# Patient Record
Sex: MALE | Race: WHITE | NOT HISPANIC OR LATINO | URBAN - METROPOLITAN AREA
[De-identification: names, ages, dates, MRNs, and addresses within clinical notes are randomized per-mention and may not be internally consistent; named-entity substitution may affect disease eponyms.]

---

## 2017-05-31 ENCOUNTER — OUTPATIENT (OUTPATIENT)
Dept: OUTPATIENT SERVICES | Facility: HOSPITAL | Age: 82
LOS: 1 days | End: 2017-05-31

## 2018-06-05 ENCOUNTER — OUTPATIENT (OUTPATIENT)
Dept: OUTPATIENT SERVICES | Facility: HOSPITAL | Age: 83
LOS: 1 days | End: 2018-06-05

## 2019-06-10 ENCOUNTER — OUTPATIENT (OUTPATIENT)
Dept: OUTPATIENT SERVICES | Facility: HOSPITAL | Age: 84
LOS: 1 days | End: 2019-06-10

## 2019-11-03 PROCEDURE — 71045 X-RAY EXAM CHEST 1 VIEW: CPT | Mod: 26

## 2019-11-04 ENCOUNTER — INPATIENT (INPATIENT)
Facility: HOSPITAL | Age: 84
LOS: 3 days | Discharge: HOME HEALTH | End: 2019-11-08
Payer: MEDICARE

## 2019-11-04 PROCEDURE — 99285 EMERGENCY DEPT VISIT HI MDM: CPT

## 2019-11-04 PROCEDURE — 93010 ELECTROCARDIOGRAM REPORT: CPT

## 2019-11-04 PROCEDURE — 76705 ECHO EXAM OF ABDOMEN: CPT | Mod: 26

## 2019-11-05 ENCOUNTER — OUTPATIENT (OUTPATIENT)
Dept: OUTPATIENT SERVICES | Facility: HOSPITAL | Age: 84
LOS: 1 days | End: 2019-11-05

## 2019-11-05 PROCEDURE — 93971 EXTREMITY STUDY: CPT | Mod: 26,RT

## 2019-11-05 PROCEDURE — 93306 TTE W/DOPPLER COMPLETE: CPT | Mod: 26

## 2019-11-05 PROCEDURE — 72148 MRI LUMBAR SPINE W/O DYE: CPT | Mod: 26

## 2019-11-06 ENCOUNTER — OUTPATIENT (OUTPATIENT)
Dept: OUTPATIENT SERVICES | Facility: HOSPITAL | Age: 84
LOS: 1 days | End: 2019-11-06

## 2019-11-07 ENCOUNTER — OUTPATIENT (OUTPATIENT)
Dept: OUTPATIENT SERVICES | Facility: HOSPITAL | Age: 84
LOS: 1 days | End: 2019-11-07

## 2019-11-13 ENCOUNTER — OUTPATIENT (OUTPATIENT)
Dept: OUTPATIENT SERVICES | Facility: HOSPITAL | Age: 84
LOS: 1 days | End: 2019-11-13
Payer: MEDICARE

## 2019-11-13 PROCEDURE — 74178 CT ABD&PLV WO CNTR FLWD CNTR: CPT | Mod: 26

## 2019-11-22 ENCOUNTER — OUTPATIENT (OUTPATIENT)
Dept: OUTPATIENT SERVICES | Facility: HOSPITAL | Age: 84
LOS: 1 days | End: 2019-11-22
Payer: MEDICARE

## 2019-11-22 PROCEDURE — 71046 X-RAY EXAM CHEST 2 VIEWS: CPT | Mod: 26

## 2019-12-09 ENCOUNTER — OUTPATIENT (OUTPATIENT)
Dept: OUTPATIENT SERVICES | Facility: HOSPITAL | Age: 84
LOS: 1 days | End: 2019-12-09

## 2020-06-08 ENCOUNTER — OUTPATIENT (OUTPATIENT)
Dept: OUTPATIENT SERVICES | Facility: HOSPITAL | Age: 85
LOS: 1 days | End: 2020-06-08

## 2020-07-05 PROBLEM — Z00.00 ENCOUNTER FOR PREVENTIVE HEALTH EXAMINATION: Status: ACTIVE | Noted: 2020-07-05

## 2020-07-10 DIAGNOSIS — Z01.818 ENCOUNTER FOR OTHER PREPROCEDURAL EXAMINATION: ICD-10-CM

## 2020-07-11 ENCOUNTER — APPOINTMENT (OUTPATIENT)
Dept: DISASTER EMERGENCY | Facility: CLINIC | Age: 85
End: 2020-07-11

## 2020-07-12 LAB — SARS-COV-2 N GENE NPH QL NAA+PROBE: NOT DETECTED

## 2020-07-14 ENCOUNTER — OUTPATIENT (OUTPATIENT)
Dept: OUTPATIENT SERVICES | Facility: HOSPITAL | Age: 85
LOS: 1 days | End: 2020-07-14

## 2020-10-02 ENCOUNTER — APPOINTMENT (OUTPATIENT)
Dept: UROLOGY | Facility: CLINIC | Age: 85
End: 2020-10-02
Payer: MEDICARE

## 2020-10-02 VITALS
TEMPERATURE: 97.3 F | SYSTOLIC BLOOD PRESSURE: 163 MMHG | HEART RATE: 75 BPM | HEIGHT: 72 IN | WEIGHT: 245 LBS | DIASTOLIC BLOOD PRESSURE: 77 MMHG | BODY MASS INDEX: 33.18 KG/M2

## 2020-10-02 DIAGNOSIS — Z78.9 OTHER SPECIFIED HEALTH STATUS: ICD-10-CM

## 2020-10-02 DIAGNOSIS — Z85.828 PERSONAL HISTORY OF OTHER MALIGNANT NEOPLASM OF SKIN: ICD-10-CM

## 2020-10-02 DIAGNOSIS — Z86.79 PERSONAL HISTORY OF OTHER DISEASES OF THE CIRCULATORY SYSTEM: ICD-10-CM

## 2020-10-02 PROCEDURE — 51798 US URINE CAPACITY MEASURE: CPT

## 2020-10-02 PROCEDURE — 99204 OFFICE O/P NEW MOD 45 MIN: CPT | Mod: 25

## 2020-10-02 RX ORDER — PRAVASTATIN SODIUM 40 MG/1
40 TABLET ORAL
Refills: 0 | Status: ACTIVE | COMMUNITY

## 2020-10-02 RX ORDER — ZOLPIDEM TARTRATE 10 MG/1
10 TABLET ORAL
Refills: 0 | Status: ACTIVE | COMMUNITY

## 2020-10-02 RX ORDER — OXYCODONE HYDROCHLORIDE AND ACETAMINOPHEN 5; 325 MG/1; MG/1
5-325 TABLET ORAL
Refills: 0 | Status: ACTIVE | COMMUNITY

## 2020-10-02 RX ORDER — LOSARTAN POTASSIUM 25 MG/1
25 TABLET, FILM COATED ORAL
Refills: 0 | Status: ACTIVE | COMMUNITY

## 2020-10-02 RX ORDER — GABAPENTIN 100 MG/1
100 CAPSULE ORAL
Refills: 0 | Status: ACTIVE | COMMUNITY

## 2020-10-02 RX ORDER — ESOMEPRAZOLE SODIUM 40 MG/5ML
40 INJECTION, POWDER, LYOPHILIZED, FOR SOLUTION INTRAVENOUS
Refills: 0 | Status: ACTIVE | COMMUNITY

## 2020-10-02 NOTE — REVIEW OF SYSTEMS
[Wake up at night to urinate  How many times?  ___] : wakes up to urinate [unfilled] times during the night [Strong urge to urinate] : strong urge to urinate [Negative] : Heme/Lymph [see HPI] : see HPI

## 2020-10-02 NOTE — HISTORY OF PRESENT ILLNESS
[FreeTextEntry1] : Mr. NOEMI MAGALLANES 85 year old  M  PMH GERD, HTN, HLD, Prostate cancer s/p prostatectomy at age 62 and PSH right knee, cholcystectomy, lower back surgery. His entire life he would have to concentrate to try and get himself to urinate Pt comes and states this has started last week. He feels he has been urinating frequently and feels it is "clogged."\par \par PVR 0\par

## 2020-10-05 LAB
APPEARANCE: CLEAR
BACTERIA UR CULT: NORMAL
BACTERIA: NEGATIVE
BILIRUBIN URINE: NEGATIVE
BLOOD URINE: NORMAL
COLOR: YELLOW
GLUCOSE QUALITATIVE U: NEGATIVE
HYALINE CASTS: 1 /LPF
KETONES URINE: NEGATIVE
LEUKOCYTE ESTERASE URINE: NEGATIVE
MICROSCOPIC-UA: NORMAL
NITRITE URINE: NEGATIVE
PH URINE: 6
PROTEIN URINE: ABNORMAL
RED BLOOD CELLS URINE: 2 /HPF
SPECIFIC GRAVITY URINE: 1.02
SQUAMOUS EPITHELIAL CELLS: 1 /HPF
UROBILINOGEN URINE: NORMAL
WHITE BLOOD CELLS URINE: 1 /HPF

## 2020-11-03 ENCOUNTER — APPOINTMENT (OUTPATIENT)
Dept: UROLOGY | Facility: CLINIC | Age: 85
End: 2020-11-03

## 2020-11-17 ENCOUNTER — APPOINTMENT (OUTPATIENT)
Dept: UROLOGY | Facility: CLINIC | Age: 85
End: 2020-11-17
Payer: MEDICARE

## 2020-11-17 VITALS
DIASTOLIC BLOOD PRESSURE: 81 MMHG | TEMPERATURE: 97.6 F | SYSTOLIC BLOOD PRESSURE: 158 MMHG | WEIGHT: 240 LBS | HEART RATE: 76 BPM | BODY MASS INDEX: 32.51 KG/M2 | HEIGHT: 72 IN

## 2020-11-17 PROCEDURE — 99213 OFFICE O/P EST LOW 20 MIN: CPT

## 2020-11-17 NOTE — HISTORY OF PRESENT ILLNESS
[FreeTextEntry1] : Pt come sin follow up LUTS. He feels the alfuzosin has helped.  Pt happier with his urination and does not wish to have a cystoscopy.

## 2021-05-04 ENCOUNTER — APPOINTMENT (OUTPATIENT)
Dept: UROLOGY | Facility: CLINIC | Age: 86
End: 2021-05-04
Payer: MEDICARE

## 2021-05-04 VITALS
DIASTOLIC BLOOD PRESSURE: 86 MMHG | WEIGHT: 240 LBS | HEART RATE: 71 BPM | BODY MASS INDEX: 32.51 KG/M2 | TEMPERATURE: 97.9 F | HEIGHT: 72 IN | SYSTOLIC BLOOD PRESSURE: 141 MMHG

## 2021-05-04 PROCEDURE — 99214 OFFICE O/P EST MOD 30 MIN: CPT

## 2021-05-17 ENCOUNTER — OUTPATIENT (OUTPATIENT)
Dept: OUTPATIENT SERVICES | Facility: HOSPITAL | Age: 86
LOS: 1 days | End: 2021-05-17
Payer: MEDICARE

## 2021-05-17 DIAGNOSIS — D69.6 THROMBOCYTOPENIA, UNSPECIFIED: ICD-10-CM

## 2021-05-18 ENCOUNTER — RESULT REVIEW (OUTPATIENT)
Age: 86
End: 2021-05-18

## 2021-05-18 ENCOUNTER — APPOINTMENT (OUTPATIENT)
Dept: HEMATOLOGY ONCOLOGY | Facility: CLINIC | Age: 86
End: 2021-05-18
Payer: MEDICARE

## 2021-05-18 ENCOUNTER — NON-APPOINTMENT (OUTPATIENT)
Age: 86
End: 2021-05-18

## 2021-05-18 VITALS
BODY MASS INDEX: 33.18 KG/M2 | SYSTOLIC BLOOD PRESSURE: 107 MMHG | HEIGHT: 72 IN | OXYGEN SATURATION: 94 % | HEART RATE: 84 BPM | TEMPERATURE: 97.6 F | DIASTOLIC BLOOD PRESSURE: 70 MMHG | WEIGHT: 245 LBS

## 2021-05-18 DIAGNOSIS — Z84.0 FAMILY HISTORY OF DISEASES OF THE SKIN AND SUBCUTANEOUS TISSUE: ICD-10-CM

## 2021-05-18 LAB
BASOPHILS # BLD AUTO: 0 K/UL — SIGNIFICANT CHANGE UP (ref 0–0.2)
BASOPHILS NFR BLD AUTO: 0 % — SIGNIFICANT CHANGE UP (ref 0–2)
EOSINOPHIL # BLD AUTO: 0 K/UL — SIGNIFICANT CHANGE UP (ref 0–0.5)
EOSINOPHIL NFR BLD AUTO: 0 % — SIGNIFICANT CHANGE UP (ref 0–6)
HCT VFR BLD CALC: 45 % — SIGNIFICANT CHANGE UP (ref 39–50)
HGB BLD-MCNC: 15.1 G/DL — SIGNIFICANT CHANGE UP (ref 13–17)
LYMPHOCYTES # BLD AUTO: 2.15 K/UL — SIGNIFICANT CHANGE UP (ref 1–3.3)
LYMPHOCYTES # BLD AUTO: 44 % — SIGNIFICANT CHANGE UP (ref 13–44)
MCHC RBC-ENTMCNC: 32.3 PG — SIGNIFICANT CHANGE UP (ref 27–34)
MCHC RBC-ENTMCNC: 33.6 GM/DL — SIGNIFICANT CHANGE UP (ref 32–36)
MCV RBC AUTO: 96.4 FL — SIGNIFICANT CHANGE UP (ref 80–100)
MONOCYTES # BLD AUTO: 0.73 K/UL — SIGNIFICANT CHANGE UP (ref 0–0.9)
MONOCYTES NFR BLD AUTO: 15 % — HIGH (ref 2–14)
NEUTROPHILS # BLD AUTO: 2 K/UL — SIGNIFICANT CHANGE UP (ref 1.8–7.4)
NEUTROPHILS NFR BLD AUTO: 41 % — LOW (ref 43–77)
NRBC # BLD: 0 /100 — SIGNIFICANT CHANGE UP (ref 0–0)
NRBC # BLD: SIGNIFICANT CHANGE UP /100 WBCS (ref 0–0)
PLAT MORPH BLD: NORMAL — SIGNIFICANT CHANGE UP
PLATELET # BLD AUTO: 124 K/UL — LOW (ref 150–400)
RBC # BLD: 4.67 M/UL — SIGNIFICANT CHANGE UP (ref 4.2–5.8)
RBC # FLD: 12.6 % — SIGNIFICANT CHANGE UP (ref 10.3–14.5)
RBC BLD AUTO: NORMAL — SIGNIFICANT CHANGE UP
TSH SERPL-MCNC: 2.6 UIU/ML — SIGNIFICANT CHANGE UP (ref 0.27–4.2)
WBC # BLD: 4.89 K/UL — SIGNIFICANT CHANGE UP (ref 3.8–10.5)
WBC # FLD AUTO: 4.89 K/UL — SIGNIFICANT CHANGE UP (ref 3.8–10.5)

## 2021-05-18 PROCEDURE — 99204 OFFICE O/P NEW MOD 45 MIN: CPT

## 2021-05-19 ENCOUNTER — NON-APPOINTMENT (OUTPATIENT)
Age: 86
End: 2021-05-19

## 2021-05-19 LAB
CRP SERPL-MCNC: 3 MG/L — SIGNIFICANT CHANGE UP
ERYTHROCYTE [SEDIMENTATION RATE] IN BLOOD: 15 MM/HR — SIGNIFICANT CHANGE UP (ref 0–20)
HBV CORE AB SER-ACNC: SIGNIFICANT CHANGE UP
HBV CORE IGM SER-ACNC: SIGNIFICANT CHANGE UP
HBV SURFACE AB SER-ACNC: <3 MIU/ML — LOW
HBV SURFACE AB SER-ACNC: SIGNIFICANT CHANGE UP
HBV SURFACE AG SER-ACNC: SIGNIFICANT CHANGE UP
HCV AB S/CO SERPL IA: 0.14 S/CO — SIGNIFICANT CHANGE UP (ref 0–0.99)
HCV AB SERPL-IMP: SIGNIFICANT CHANGE UP
LDH SERPL L TO P-CCNC: 147 U/L — SIGNIFICANT CHANGE UP (ref 50–242)

## 2021-05-25 NOTE — RESULTS/DATA
[FreeTextEntry1] : Mr. Couch is a pleasant 86 year-old man with a history of prostate cancer status-post prostatectomy, now with rising PSA and ureteral blockage requiring stent, incidentally found to have mild thrombocytopenia.

## 2021-05-25 NOTE — HISTORY OF PRESENT ILLNESS
[de-identified] : Mr. Couch has a remote history of prostate cancer status-post prostatectomy (patient states 20 years ago), here for evaluation of thrombocytopenia. Paulino spends part of the year near [place], [place]. 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 He states that his PSA was elevated at that time, but that a PET-CT was negative for recurrent disease. \par \par Upon his return to [place], he saw Dr. Natarajan, who incidentally found a platelet count of 104. PSA was 2.7 at that time. Upon my review of available labs, Mr. Couch has had intermittent bouts of thrombocytopenia going back to at least 2019, ranging from 102 to 138. Of note, ESR and CRP were elevated at that time.\par \par Paulino drinks 2 alcoholic drinks every other day, is not known to have any liver disease or hepatosplenomegaly. He denies a history of chronic viral illnesses, no ongoing blood loss. He notes arm ecchymoses, but no petechiae.

## 2021-05-25 NOTE — CONSULT LETTER
[Dear  ___] : Dear  [unfilled], [Consult Letter:] : I had the pleasure of evaluating your patient, [unfilled]. [Please see my note below.] : Please see my note below. [Consult Closing:] : Thank you very much for allowing me to participate in the care of this patient.  If you have any questions, please do not hesitate to contact me. [Sincerely,] : Sincerely, [FreeTextEntry2] : Dr. Mary Natarajan [FreeTextEntry3] : Kali Lofton MD\par

## 2021-05-25 NOTE — PHYSICAL EXAM
[Ambulatory and capable of all self care but unable to carry out any work activities] : Status 2- Ambulatory and capable of all self care but unable to carry out any work activities. Up and about more than 50% of waking hours [Obese] : obese [Normal] : affect appropriate [de-identified] : walks with a cane [de-identified] : anicteric [de-identified] : antalgic gait

## 2021-06-08 ENCOUNTER — NON-APPOINTMENT (OUTPATIENT)
Age: 86
End: 2021-06-08

## 2021-06-08 ENCOUNTER — APPOINTMENT (OUTPATIENT)
Dept: UROLOGY | Facility: CLINIC | Age: 86
End: 2021-06-08
Payer: MEDICARE

## 2021-06-08 PROCEDURE — 99213 OFFICE O/P EST LOW 20 MIN: CPT

## 2021-06-08 NOTE — HISTORY OF PRESENT ILLNESS
[FreeTextEntry1] : Pt comes in follow up needing a stent placed in Jan bc of a blockage in the ureter. He had a stent placed. In April he had the stent removed. Pt currently on Alfuzosin. \par \par 3/5/21 PSA 2.5. \par 4/28/21 PSA 2.7.\par \par

## 2021-06-16 ENCOUNTER — APPOINTMENT (OUTPATIENT)
Dept: ULTRASOUND IMAGING | Facility: CLINIC | Age: 86
End: 2021-06-16
Payer: MEDICARE

## 2021-06-16 PROCEDURE — 76770 US EXAM ABDO BACK WALL COMP: CPT

## 2021-06-17 ENCOUNTER — APPOINTMENT (OUTPATIENT)
Dept: OPHTHALMOLOGY | Facility: CLINIC | Age: 86
End: 2021-06-17

## 2021-07-09 ENCOUNTER — APPOINTMENT (OUTPATIENT)
Dept: OPHTHALMOLOGY | Facility: CLINIC | Age: 86
End: 2021-07-09

## 2021-07-20 ENCOUNTER — OUTPATIENT (OUTPATIENT)
Dept: OUTPATIENT SERVICES | Facility: HOSPITAL | Age: 86
LOS: 1 days | End: 2021-07-20

## 2021-08-18 ENCOUNTER — APPOINTMENT (OUTPATIENT)
Dept: HEMATOLOGY ONCOLOGY | Facility: CLINIC | Age: 86
End: 2021-08-18

## 2021-10-20 ENCOUNTER — APPOINTMENT (OUTPATIENT)
Dept: UROLOGY | Facility: CLINIC | Age: 86
End: 2021-10-20
Payer: MEDICARE

## 2021-10-20 VITALS
WEIGHT: 245 LBS | HEART RATE: 73 BPM | TEMPERATURE: 97.5 F | DIASTOLIC BLOOD PRESSURE: 81 MMHG | SYSTOLIC BLOOD PRESSURE: 162 MMHG | BODY MASS INDEX: 33.18 KG/M2 | HEIGHT: 72 IN

## 2021-10-20 PROCEDURE — 99214 OFFICE O/P EST MOD 30 MIN: CPT

## 2021-10-20 NOTE — ASSESSMENT
[FreeTextEntry1] : Plan\par UA\par culture\par BMP\par c/w alfuzosin\par renal bladder US\par PSA\par fu with Dr. Keller.

## 2021-10-20 NOTE — HISTORY OF PRESENT ILLNESS
[FreeTextEntry1] : Pt having lower abdominal pain that started week ago that is on and off. \par \par 4/28/21 PSA 2.7 s/p prostatectomy

## 2021-10-21 ENCOUNTER — RESULT REVIEW (OUTPATIENT)
Age: 86
End: 2021-10-21

## 2021-10-21 ENCOUNTER — APPOINTMENT (OUTPATIENT)
Dept: ULTRASOUND IMAGING | Facility: CLINIC | Age: 86
End: 2021-10-21
Payer: MEDICARE

## 2021-10-21 ENCOUNTER — APPOINTMENT (OUTPATIENT)
Dept: FAMILY MEDICINE | Facility: CLINIC | Age: 86
End: 2021-10-21
Payer: MEDICARE

## 2021-10-21 ENCOUNTER — LABORATORY RESULT (OUTPATIENT)
Age: 86
End: 2021-10-21

## 2021-10-21 PROCEDURE — 76770 US EXAM ABDO BACK WALL COMP: CPT

## 2021-10-21 PROCEDURE — 36415 COLL VENOUS BLD VENIPUNCTURE: CPT

## 2021-10-27 ENCOUNTER — RESULT REVIEW (OUTPATIENT)
Age: 86
End: 2021-10-27

## 2021-10-27 ENCOUNTER — APPOINTMENT (OUTPATIENT)
Dept: UROLOGY | Facility: CLINIC | Age: 86
End: 2021-10-27
Payer: MEDICARE

## 2021-10-27 VITALS
TEMPERATURE: 97.7 F | HEART RATE: 79 BPM | HEIGHT: 72 IN | WEIGHT: 245 LBS | SYSTOLIC BLOOD PRESSURE: 138 MMHG | DIASTOLIC BLOOD PRESSURE: 85 MMHG | BODY MASS INDEX: 33.18 KG/M2

## 2021-10-27 PROCEDURE — 99215 OFFICE O/P EST HI 40 MIN: CPT

## 2021-10-29 ENCOUNTER — APPOINTMENT (OUTPATIENT)
Dept: CT IMAGING | Facility: CLINIC | Age: 86
End: 2021-10-29
Payer: MEDICARE

## 2021-10-29 PROCEDURE — 74176 CT ABD & PELVIS W/O CONTRAST: CPT | Mod: MH

## 2021-10-29 NOTE — ASSESSMENT
[FreeTextEntry1] : Biochemically recurrent PCa, steadily rising PSA - now PSA 3.96 - R/O metastatic hormone sensitive prostate cancer\par Elevated Creat: to 1.86\par hx of right hydro s/p stent placement x2\par Also incomplete bladder emptying\par \par Plan:\par Options for [facility]:\par 1) ADT + 6 cycles Docetaxel  (CHAARTED / STAMPEDE trials)\par \par 2) ADT + Abiraterone (Lattitude / STAMPEDE - 40% risk reduction with Janneth)\par \par 3) ADT + Apalutamide (TITAN - 52% risk reduction)\par \par 4) ADT +  Enzalutamide (Enzamet / ARCHES trial - 39% risk reduction)\par \par 5) ADT alone\par \par Patient will need a med onc consult to discuss options for tx.\par \par CT urogram\par Bone scan\par Start ADT in next 2 weeks\par FU with me in 2 weeks\par Referral to nephrology Dr Alessandro Medrano\par Follow-up with Dr Lofton med onc\par Please obtain CD's of CT scan and PET scan done in [place] in winter 2021 from the patient. should mail it to our office (all reports have been scanned to our charts).

## 2021-10-29 NOTE — HISTORY OF PRESENT ILLNESS
[FreeTextEntry1] : 85 yo male patient, hx of radical prostatectomy, 25 years ago\par Patient and wife mention it was "even lower than Gl 7 disease"\par PSA was undetectable for years\par Recently in Jan/feb 2021: found to be 2.5\par Repeated PSA October 2021: PSA=3.96\par never gotten treatment for any biochemical recurrence\par PET Axumen scan Feb 2021: no evidence of avidity / recurrence\par \par Also he had new onset right flank pain: \par per report from outside phys in [place] jan feb 2021: severe hydro right side\par had stent insertion x 2, removed stent in april 2021 (hydro was assumed to be due to possible right ureter stricture)\par \par Now cr is rising to 1.8\par PSA rising to 3.96\par \par SHARITA: flat, no obvious evidence of PCA recurrence\par Recent Ultrasound:  cc from a prevoid of 158 cc\par no hydro on US

## 2021-10-29 NOTE — LETTER BODY
[Dear  ___] : Dear  [unfilled], [Consult Letter:] : I had the pleasure of evaluating your patient, [unfilled]. [Please see my note below.] : Please see my note below. [Consult Closing:] : Thank you very much for allowing me to participate in the care of this patient.  If you have any questions, please do not hesitate to contact me. [Sincerely,] : Sincerely, [FreeTextEntry1] : Biochemical recurrence of Prostate cancer PSA 3.96\par R/O metastatic hormone sensitive PCa [FreeTextEntry3] : Brandon Keller MD\par Urologic Oncology\par [facility]\par [facility] [place] of Urology at [place]\par [facility]\par

## 2021-11-02 ENCOUNTER — APPOINTMENT (OUTPATIENT)
Dept: UROLOGY | Facility: CLINIC | Age: 86
End: 2021-11-02
Payer: MEDICARE

## 2021-11-02 VITALS
OXYGEN SATURATION: 95 % | SYSTOLIC BLOOD PRESSURE: 162 MMHG | WEIGHT: 240 LBS | TEMPERATURE: 96.8 F | HEART RATE: 75 BPM | BODY MASS INDEX: 32.51 KG/M2 | HEIGHT: 72 IN | DIASTOLIC BLOOD PRESSURE: 95 MMHG

## 2021-11-02 DIAGNOSIS — N13.30 UNSPECIFIED HYDRONEPHROSIS: ICD-10-CM

## 2021-11-02 LAB
ANION GAP SERPL CALC-SCNC: 22 MMOL/L
APPEARANCE: ABNORMAL
BACTERIA UR CULT: NORMAL
BACTERIA: NEGATIVE
BILIRUBIN URINE: NEGATIVE
BLOOD URINE: NORMAL
BUN SERPL-MCNC: 17 MG/DL
CALCIUM SERPL-MCNC: 8.9 MG/DL
CHLORIDE SERPL-SCNC: 102 MMOL/L
CO2 SERPL-SCNC: 13 MMOL/L
COLOR: ABNORMAL
CREAT SERPL-MCNC: 1.83 MG/DL
GLUCOSE QUALITATIVE U: NEGATIVE
GLUCOSE SERPL-MCNC: 144 MG/DL
HYALINE CASTS: 2 /LPF
KETONES URINE: NEGATIVE
LEUKOCYTE ESTERASE URINE: NEGATIVE
MICROSCOPIC-UA: NORMAL
NITRITE URINE: NEGATIVE
PH URINE: 5.5
POTASSIUM SERPL-SCNC: 4.4 MMOL/L
PROTEIN URINE: ABNORMAL
PSA SERPL-MCNC: 3.96 NG/ML
RED BLOOD CELLS URINE: 1 /HPF
SODIUM SERPL-SCNC: 137 MMOL/L
SPECIFIC GRAVITY URINE: 1.02
SQUAMOUS EPITHELIAL CELLS: 1 /HPF
UROBILINOGEN URINE: NORMAL
WHITE BLOOD CELLS URINE: 1 /HPF

## 2021-11-02 PROCEDURE — 99213 OFFICE O/P EST LOW 20 MIN: CPT

## 2021-11-02 NOTE — PHYSICAL EXAM
[General Appearance - Well Developed] : well developed [General Appearance - Well Nourished] : well nourished [Normal Appearance] : normal appearance [Well Groomed] : well groomed [General Appearance - In No Acute Distress] : no acute distress [Abdomen Soft] : soft [Abdomen Tenderness] : non-tender [Costovertebral Angle Tenderness] : no ~M costovertebral angle tenderness [Urethral Meatus] : meatus normal [Urinary Bladder Findings] : the bladder was normal on palpation [Scrotum] : the scrotum was normal [Testes Mass (___cm)] : there were no testicular masses [No Prostate Nodules] : no prostate nodules [Edema] : no peripheral edema [] : no respiratory distress [Respiration, Rhythm And Depth] : normal respiratory rhythm and effort [Exaggerated Use Of Accessory Muscles For Inspiration] : no accessory muscle use [Oriented To Time, Place, And Person] : oriented to person, place, and time [Affect] : the affect was normal [Mood] : the mood was normal [Not Anxious] : not anxious [Normal Station and Gait] : the gait and station were normal for the patient's age [No Focal Deficits] : no focal deficits

## 2021-11-02 NOTE — HISTORY OF PRESENT ILLNESS
[FreeTextEntry1] : Pt comes it to review most recent PSA and renal bladder US. 10/21 US shows no hydro\par \par 10/21/21 PSA 3.96

## 2021-11-03 ENCOUNTER — RESULT REVIEW (OUTPATIENT)
Age: 86
End: 2021-11-03

## 2021-11-03 ENCOUNTER — APPOINTMENT (OUTPATIENT)
Dept: NUCLEAR MEDICINE | Facility: CLINIC | Age: 86
End: 2021-11-03
Payer: MEDICARE

## 2021-11-03 ENCOUNTER — OUTPATIENT (OUTPATIENT)
Dept: OUTPATIENT SERVICES | Facility: HOSPITAL | Age: 86
LOS: 1 days | End: 2021-11-03

## 2021-11-03 DIAGNOSIS — C61 MALIGNANT NEOPLASM OF PROSTATE: ICD-10-CM

## 2021-11-03 PROCEDURE — 78306 BONE IMAGING WHOLE BODY: CPT | Mod: 26,ME

## 2021-11-03 PROCEDURE — 78830 RP LOCLZJ TUM SPECT W/CT 1: CPT | Mod: 26

## 2021-11-03 PROCEDURE — G1004: CPT

## 2021-11-08 ENCOUNTER — APPOINTMENT (OUTPATIENT)
Dept: UROLOGY | Facility: CLINIC | Age: 86
End: 2021-11-08
Payer: MEDICARE

## 2021-11-09 ENCOUNTER — APPOINTMENT (OUTPATIENT)
Dept: UROLOGY | Facility: CLINIC | Age: 86
End: 2021-11-09
Payer: MEDICARE

## 2021-11-09 VITALS
WEIGHT: 240 LBS | DIASTOLIC BLOOD PRESSURE: 83 MMHG | HEART RATE: 69 BPM | SYSTOLIC BLOOD PRESSURE: 140 MMHG | HEIGHT: 72 IN | BODY MASS INDEX: 32.51 KG/M2 | TEMPERATURE: 96.9 F | OXYGEN SATURATION: 98 %

## 2021-11-09 PROCEDURE — 99214 OFFICE O/P EST MOD 30 MIN: CPT

## 2021-11-09 NOTE — HISTORY OF PRESENT ILLNESS
[FreeTextEntry1] : 85 yo male patient, hx of radical prostatectomy, 25 years ago\par Patient and wife mention it was "even lower than Gl 7 disease"\par PSA was undetectable for years\par Recently in Jan/feb 2021: found to be 2.5\par Repeated PSA October 2021: PSA=3.96\par never gotten treatment for any biochemical recurrence\par PET Axumen scan Feb 2021: no evidence of avidity / recurrence\par \par Also he had new onset right flank pain: \par per report from outside phys in [place] jan feb 2021: severe hydro right side\par had stent insertion x 2, removed stent in april 2021 (hydro was assumed to be due to possible right ureter stricture)\par \par Now cr is rising to 1.8\par PSA rising to 3.96\par \par SHARITA: flat, no obvious evidence of PCA recurrence\par Recent Ultrasound:  cc from a prevoid of 158 cc\par no hydro on US\par \par Follow-up Nov 9:\par Bone scan: abnormal scan, single focus increased activity in skull - Do MRI\par CT non-contrast due to compromised renal function: No mets, no pelvic adenopathy.\par

## 2021-11-09 NOTE — LETTER BODY
[FreeTextEntry1] : Biochemical recurrence of Prostate cancer PSA 3.96\par R/O metastatic hormone sensitive PCa [FreeTextEntry3] : Brandon Keller MD\par Urologic Oncology\par [facility] Endoscopic urology\par [facility] [place] of [facility]rology at [place]\par [facility]\par

## 2021-11-09 NOTE — ASSESSMENT
[FreeTextEntry1] : Biochemically recurrent PCa, steadily rising PSA - now PSA 3.96 - R/O metastatic hormone sensitive prostate cancer\par Elevated Creat: to 1.8\par hx of right hydro s/p stent placement x2\par Nov 2021: CT non-contrast due to compromised renal function: No mets, no pelvic adenopathy, no hydro.\par Bone scan: abnormal scan, single focus increased activity in skull - DO MRI\par \par \par Plan:\par MRI skull with gado to R/O skull mets\par Refer to Dr Lofton for opinion and FU\par ADT options discussed: medical leupron vs. Surgical castration \par \par eventually if medical tx to be pursued, multiple options available:\par Options for [facility]:\par 1) ADT + 6 cycles Docetaxel  (CHAARTED / STAMPEDE trials)\par \par 2) ADT + Abiraterone (Lattitude / STAMPEDE - 40% risk reduction with Janneth)\par \par 3) ADT + Apalutamide (TITAN - 52% risk reduction)\par \par 4) ADT +  Enzalutamide (Enzamet / ARCHES trial - 39% risk reduction)\par \par 5) ADT alone\par \par \par \par

## 2021-11-16 ENCOUNTER — APPOINTMENT (OUTPATIENT)
Dept: OPHTHALMOLOGY | Facility: CLINIC | Age: 86
End: 2021-11-16
Payer: MEDICARE

## 2021-11-16 ENCOUNTER — RESULT REVIEW (OUTPATIENT)
Age: 86
End: 2021-11-16

## 2021-11-16 ENCOUNTER — NON-APPOINTMENT (OUTPATIENT)
Age: 86
End: 2021-11-16

## 2021-11-16 ENCOUNTER — APPOINTMENT (OUTPATIENT)
Dept: HEMATOLOGY ONCOLOGY | Facility: CLINIC | Age: 86
End: 2021-11-16
Payer: MEDICARE

## 2021-11-16 ENCOUNTER — OUTPATIENT (OUTPATIENT)
Dept: OUTPATIENT SERVICES | Facility: HOSPITAL | Age: 86
LOS: 1 days | End: 2021-11-16

## 2021-11-16 VITALS
OXYGEN SATURATION: 93 % | TEMPERATURE: 97.9 F | DIASTOLIC BLOOD PRESSURE: 95 MMHG | WEIGHT: 254.85 LBS | SYSTOLIC BLOOD PRESSURE: 180 MMHG | HEART RATE: 74 BPM | BODY MASS INDEX: 34.56 KG/M2

## 2021-11-16 DIAGNOSIS — D69.6 THROMBOCYTOPENIA, UNSPECIFIED: ICD-10-CM

## 2021-11-16 LAB
BASOPHILS # BLD AUTO: 0.01 K/UL — SIGNIFICANT CHANGE UP (ref 0–0.2)
BASOPHILS NFR BLD AUTO: 0.2 % — SIGNIFICANT CHANGE UP (ref 0–2)
EOSINOPHIL # BLD AUTO: 0.02 K/UL — SIGNIFICANT CHANGE UP (ref 0–0.5)
EOSINOPHIL NFR BLD AUTO: 0.3 % — SIGNIFICANT CHANGE UP (ref 0–6)
HCT VFR BLD CALC: 47.4 % — SIGNIFICANT CHANGE UP (ref 39–50)
HGB BLD-MCNC: 15.9 G/DL — SIGNIFICANT CHANGE UP (ref 13–17)
IMM GRANULOCYTES NFR BLD AUTO: 0.7 % — SIGNIFICANT CHANGE UP (ref 0–1.5)
LYMPHOCYTES # BLD AUTO: 2.86 K/UL — SIGNIFICANT CHANGE UP (ref 1–3.3)
LYMPHOCYTES # BLD AUTO: 48.8 % — HIGH (ref 13–44)
MCHC RBC-ENTMCNC: 31.5 PG — SIGNIFICANT CHANGE UP (ref 27–34)
MCHC RBC-ENTMCNC: 33.5 GM/DL — SIGNIFICANT CHANGE UP (ref 32–36)
MCV RBC AUTO: 94 FL — SIGNIFICANT CHANGE UP (ref 80–100)
MONOCYTES # BLD AUTO: 1.03 K/UL — HIGH (ref 0–0.9)
MONOCYTES NFR BLD AUTO: 17.6 % — HIGH (ref 2–14)
NEUTROPHILS # BLD AUTO: 1.9 K/UL — SIGNIFICANT CHANGE UP (ref 1.8–7.4)
NEUTROPHILS NFR BLD AUTO: 32.4 % — LOW (ref 43–77)
NRBC # BLD: 0 /100 WBCS — SIGNIFICANT CHANGE UP (ref 0–0)
PLATELET # BLD AUTO: 117 K/UL — LOW (ref 150–400)
RBC # BLD: 5.04 M/UL — SIGNIFICANT CHANGE UP (ref 4.2–5.8)
RBC # FLD: 12.7 % — SIGNIFICANT CHANGE UP (ref 10.3–14.5)
WBC # BLD: 5.86 K/UL — SIGNIFICANT CHANGE UP (ref 3.8–10.5)
WBC # FLD AUTO: 5.86 K/UL — SIGNIFICANT CHANGE UP (ref 3.8–10.5)

## 2021-11-16 PROCEDURE — 86705 HEP B CORE ANTIBODY IGM: CPT

## 2021-11-16 PROCEDURE — 92012 INTRM OPH EXAM EST PATIENT: CPT

## 2021-11-16 PROCEDURE — 85652 RBC SED RATE AUTOMATED: CPT

## 2021-11-16 PROCEDURE — 87340 HEPATITIS B SURFACE AG IA: CPT

## 2021-11-16 PROCEDURE — 83615 LACTATE (LD) (LDH) ENZYME: CPT

## 2021-11-16 PROCEDURE — 84443 ASSAY THYROID STIM HORMONE: CPT

## 2021-11-16 PROCEDURE — 80053 COMPREHEN METABOLIC PANEL: CPT

## 2021-11-16 PROCEDURE — 85027 COMPLETE CBC AUTOMATED: CPT

## 2021-11-16 PROCEDURE — 99214 OFFICE O/P EST MOD 30 MIN: CPT

## 2021-11-16 PROCEDURE — 86140 C-REACTIVE PROTEIN: CPT

## 2021-11-16 PROCEDURE — 84153 ASSAY OF PSA TOTAL: CPT

## 2021-11-16 PROCEDURE — 86706 HEP B SURFACE ANTIBODY: CPT

## 2021-11-16 PROCEDURE — 86704 HEP B CORE ANTIBODY TOTAL: CPT

## 2021-11-16 PROCEDURE — 86803 HEPATITIS C AB TEST: CPT

## 2021-11-16 NOTE — REVIEW OF SYSTEMS
[Joint Pain] : joint pain [Fever] : no fever [Chills] : no chills [Fatigue] : no fatigue [Recent Change In Weight] : ~T no recent weight change [Vision Problems] : no vision problems [Dysphagia] : no dysphagia [Odynophagia] : no odynophagia [Chest Pain] : no chest pain [Lower Ext Edema] : no lower extremity edema [Shortness Of Breath] : no shortness of breath [SOB on Exertion] : no shortness of breath during exertion [Abdominal Pain] : no abdominal pain [Skin Rash] : no skin rash [Fainting] : no fainting [Difficulty Walking] : no difficulty walking [Anxiety] : no anxiety [Depression] : no depression [Easy Bleeding] : no tendency for easy bleeding [Swollen Glands] : no swollen glands [FreeTextEntry9] : back pain, chronic

## 2021-11-16 NOTE — REASON FOR VISIT
[Follow-Up Visit] : a follow-up visit for [Spouse] : spouse [FreeTextEntry2] : Thrombocytopenia and Prostate Cancer

## 2021-11-16 NOTE — HISTORY OF PRESENT ILLNESS
[de-identified] : Mr. Couch has a remote history of prostate cancer status-post prostatectomy (patient states 20 years ago), here for evaluation of thrombocytopenia. Paulino spends part of the year near [place], [place]. 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 He states that his PSA was elevated at that time, but that a PET-CT was negative for recurrent disease. \par \par Upon his return to [place], he saw Dr. Natarajan, who incidentally found a platelet count of 104. PSA was 2.7 at that time. Upon my review of available labs, Mr. Couch has had intermittent bouts of thrombocytopenia going back to at least 2019, ranging from 102 to 138. Of note, ESR and CRP were elevated at that time.\par \par Paulino drinks 2 alcoholic drinks every other day, is not known to have any liver disease or hepatosplenomegaly. He denies a history of chronic viral illnesses, no ongoing blood loss. He notes arm ecchymoses, but no petechiae. [de-identified] : Recent follow-up with his urologists Arlene Keller and Codi revealed continued rise in PSA concerning for biochemical recurrence (PSA rising from 2.5 to 3.96 over the past year). He had a bone scan that showed a single focus of increased activity in the skull- awaiting MRI head. CT revealed no evidence of metastatic disease (axumen PET earlier this year was WNL).\par \par Planning on annual return to [place] in December. Had long discussions with Dr. Keller about possible treatments, here to discuss.

## 2021-11-16 NOTE — PHYSICAL EXAM
[Ambulatory and capable of all self care but unable to carry out any work activities] : Status 2- Ambulatory and capable of all self care but unable to carry out any work activities. Up and about more than 50% of waking hours [Obese] : obese [Normal] : affect appropriate [de-identified] : walks with a cane [de-identified] : anicteric [de-identified] : antalgic gait

## 2021-11-17 LAB
ALBUMIN SERPL ELPH-MCNC: 4.3 G/DL — SIGNIFICANT CHANGE UP (ref 3.3–5)
ALP SERPL-CCNC: 76 U/L — SIGNIFICANT CHANGE UP (ref 40–120)
ALT FLD-CCNC: 18 U/L — SIGNIFICANT CHANGE UP (ref 10–45)
ANION GAP SERPL CALC-SCNC: 13 MMOL/L — SIGNIFICANT CHANGE UP (ref 5–17)
AST SERPL-CCNC: 25 U/L — SIGNIFICANT CHANGE UP (ref 10–40)
BILIRUB SERPL-MCNC: 0.5 MG/DL — SIGNIFICANT CHANGE UP (ref 0.2–1.2)
BUN SERPL-MCNC: 17 MG/DL — SIGNIFICANT CHANGE UP (ref 7–23)
CALCIUM SERPL-MCNC: 8.9 MG/DL — SIGNIFICANT CHANGE UP (ref 8.4–10.5)
CHLORIDE SERPL-SCNC: 103 MMOL/L — SIGNIFICANT CHANGE UP (ref 96–108)
CO2 SERPL-SCNC: 24 MMOL/L — SIGNIFICANT CHANGE UP (ref 22–31)
CREAT SERPL-MCNC: 1.8 MG/DL — HIGH (ref 0.5–1.3)
GLUCOSE SERPL-MCNC: 85 MG/DL — SIGNIFICANT CHANGE UP (ref 70–99)
POTASSIUM SERPL-MCNC: 4.7 MMOL/L — SIGNIFICANT CHANGE UP (ref 3.5–5.3)
POTASSIUM SERPL-SCNC: 4.7 MMOL/L — SIGNIFICANT CHANGE UP (ref 3.5–5.3)
PROT SERPL-MCNC: 7.1 G/DL — SIGNIFICANT CHANGE UP (ref 6–8.3)
PSA SERPL-MCNC: 4.15 NG/ML — HIGH (ref 0–4)
SODIUM SERPL-SCNC: 140 MMOL/L — SIGNIFICANT CHANGE UP (ref 135–145)

## 2021-11-24 ENCOUNTER — RESULT REVIEW (OUTPATIENT)
Age: 86
End: 2021-11-24

## 2021-11-24 ENCOUNTER — APPOINTMENT (OUTPATIENT)
Dept: MRI IMAGING | Facility: CLINIC | Age: 86
End: 2021-11-24
Payer: MEDICARE

## 2021-11-24 PROCEDURE — 70553 MRI BRAIN STEM W/O & W/DYE: CPT | Mod: MH

## 2021-11-24 PROCEDURE — A9585: CPT

## 2021-11-29 ENCOUNTER — APPOINTMENT (OUTPATIENT)
Dept: UROLOGY | Facility: CLINIC | Age: 86
End: 2021-11-29
Payer: MEDICARE

## 2021-11-29 VITALS
WEIGHT: 254 LBS | HEIGHT: 72 IN | BODY MASS INDEX: 34.4 KG/M2 | HEART RATE: 75 BPM | SYSTOLIC BLOOD PRESSURE: 155 MMHG | TEMPERATURE: 97.7 F | DIASTOLIC BLOOD PRESSURE: 85 MMHG

## 2021-11-29 PROCEDURE — 99213 OFFICE O/P EST LOW 20 MIN: CPT

## 2021-11-29 NOTE — ASSESSMENT
[FreeTextEntry1] : Biochemically recurrent PCa, steadily rising PSA - now PSA 3.96 - R/O metastatic hormone sensitive prostate cancer\par Elevated Creat: to 1.8\par hx of right hydro s/p stent placement x2\par Nov 2021: CT non-contrast due to compromised renal function: No mets, no pelvic adenopathy, no hydro.\par Bone scan: abnormal scan, single focus increased activity in skull - DO MRI\par Follow-up Nov 29\par Saw Dr Mayfield: will proceed with ADT\par MRI skull done: still not read\par All side effects related to ADT discussed with patient. Only Leupron injections. no casodex (per patient)\par \par \par Plan:\par Based on options below and discussion with Dr Lofton, patient will proceed with ADT alone.\par Will follow-up with MRI skull result\par RTC in 6 months for PSA\par \par \par \par

## 2021-11-29 NOTE — LETTER BODY
[Dear  ___] : Dear  [unfilled], [Consult Letter:] : I had the pleasure of evaluating your patient, [unfilled]. [Please see my note below.] : Please see my note below. [Consult Closing:] : Thank you very much for allowing me to participate in the care of this patient.  If you have any questions, please do not hesitate to contact me. [Sincerely,] : Sincerely, [FreeTextEntry1] : Biochemical recurrence of Prostate cancer PSA 3.96\par R/O metastatic hormone sensitive PCa [FreeTextEntry3] : Brandon Keller MD\par Urologic Oncology\par Rob[facility] Endoscopic urology\par [facility] [place] of Urology at [place]\par [facility]\par

## 2021-11-29 NOTE — HISTORY OF PRESENT ILLNESS
[FreeTextEntry1] : 85 yo male patient, hx of radical prostatectomy, 25 years ago\par Patient and wife mention it was "even lower than Gl 7 disease"\par PSA was undetectable for years\par Recently in Jan/feb 2021: found to be 2.5\par Repeated PSA October 2021: PSA=3.96\par never gotten treatment for any biochemical recurrence\par PET Axumen scan Feb 2021: no evidence of avidity / recurrence\par \par Also he had new onset right flank pain: \par per report from outside phys in [place] jan feb 2021: severe hydro right side\par had stent insertion x 2, removed stent in april 2021 (hydro was assumed to be due to possible right ureter stricture)\par \par Now cr is rising to 1.8\par PSA rising to 3.96\par \par SHARITA: flat, no obvious evidence of PCA recurrence\par Recent Ultrasound:  cc from a prevoid of 158 cc\par no hydro on US\par \par Follow-up Nov 9:\par Bone scan: abnormal scan, single focus increased activity in skull - Do MRI\par CT non-contrast due to compromised renal function: No mets, no pelvic adenopathy.\par \par Follow-up Nov 29\par Saw Dr Mayfield: will proceed with ADT\par MRI skull done: still not read

## 2021-11-29 NOTE — HISTORY OF PRESENT ILLNESS
[FreeTextEntry1] : 87 yo male patient, hx of radical prostatectomy, 25 years ago\par Patient and wife mention it was "even lower than Gl 7 disease"\par PSA was undetectable for years\par Recently in Jan/feb 2021: found to be 2.5\par Repeated PSA October 2021: PSA=3.96\par never gotten treatment for any biochemical recurrence\par PET Axumen scan Feb 2021: no evidence of avidity / recurrence\par \par Also he had new onset right flank pain: \par per report from outside phys in [place] jan feb 2021: severe hydro right side\par had stent insertion x 2, removed stent in april 2021 (hydro was assumed to be due to possible right ureter stricture)\par \par Now cr is rising to 1.8\par PSA rising to 3.96\par \par SHARITA: flat, no obvious evidence of PCA recurrence\par Recent Ultrasound:  cc from a prevoid of 158 cc\par no hydro on US\par \par Follow-up Nov 9:\par Bone scan: abnormal scan, single focus increased activity in skull - Do MRI\par CT non-contrast due to compromised renal function: No mets, no pelvic adenopathy.\par \par Follow-up Nov 29\par Saw Dr Mayfield: will proceed with ADT\par MRI skull done: still not read

## 2021-11-29 NOTE — LETTER BODY
[Dear  ___] : Dear  [unfilled], [Consult Letter:] : I had the pleasure of evaluating your patient, [unfilled]. [Please see my note below.] : Please see my note below. [Consult Closing:] : Thank you very much for allowing me to participate in the care of this patient.  If you have any questions, please do not hesitate to contact me. [Sincerely,] : Sincerely, [FreeTextEntry1] : Biochemical recurrence of Prostate cancer PSA 3.96\par R/O metastatic hormone sensitive PCa [FreeTextEntry3] : Brandon Keller MD\par Urologic Oncology\par Robotic & Endoscopic urology\par [facility] [place] of Urology at [place]\par [facility]\par

## 2021-12-03 ENCOUNTER — APPOINTMENT (OUTPATIENT)
Dept: OPHTHALMOLOGY | Facility: CLINIC | Age: 86
End: 2021-12-03
Payer: MEDICARE

## 2021-12-03 ENCOUNTER — NON-APPOINTMENT (OUTPATIENT)
Age: 86
End: 2021-12-03

## 2021-12-03 PROCEDURE — 92133 CPTRZD OPH DX IMG PST SGM ON: CPT

## 2021-12-03 PROCEDURE — 92014 COMPRE OPH EXAM EST PT 1/>: CPT

## 2021-12-19 ENCOUNTER — OUTPATIENT (OUTPATIENT)
Dept: OUTPATIENT SERVICES | Facility: HOSPITAL | Age: 86
LOS: 1 days | End: 2021-12-19
Payer: MEDICARE

## 2021-12-19 DIAGNOSIS — D69.6 THROMBOCYTOPENIA, UNSPECIFIED: ICD-10-CM

## 2021-12-20 ENCOUNTER — APPOINTMENT (OUTPATIENT)
Dept: INFUSION THERAPY | Facility: CANCER CENTER | Age: 86
End: 2021-12-20

## 2021-12-20 PROCEDURE — 96372 THER/PROPH/DIAG INJ SC/IM: CPT

## 2021-12-21 DIAGNOSIS — C61 MALIGNANT NEOPLASM OF PROSTATE: ICD-10-CM

## 2022-01-03 ENCOUNTER — NON-APPOINTMENT (OUTPATIENT)
Age: 87
End: 2022-01-03

## 2022-04-28 ENCOUNTER — APPOINTMENT (OUTPATIENT)
Dept: FAMILY MEDICINE | Facility: CLINIC | Age: 87
End: 2022-04-28
Payer: MEDICARE

## 2022-04-28 PROCEDURE — 36415 COLL VENOUS BLD VENIPUNCTURE: CPT

## 2022-04-29 ENCOUNTER — OUTPATIENT (OUTPATIENT)
Dept: OUTPATIENT SERVICES | Facility: HOSPITAL | Age: 87
LOS: 1 days | End: 2022-04-29

## 2022-04-29 DIAGNOSIS — Z01.812 ENCOUNTER FOR PREPROCEDURAL LABORATORY EXAMINATION: ICD-10-CM

## 2022-04-29 LAB — PSA SERPL-MCNC: <0.01 NG/ML

## 2022-05-02 ENCOUNTER — NON-APPOINTMENT (OUTPATIENT)
Age: 87
End: 2022-05-02

## 2022-05-04 ENCOUNTER — APPOINTMENT (OUTPATIENT)
Dept: UROLOGY | Facility: CLINIC | Age: 87
End: 2022-05-04
Payer: MEDICARE

## 2022-05-04 VITALS
BODY MASS INDEX: 32.51 KG/M2 | TEMPERATURE: 94.5 F | SYSTOLIC BLOOD PRESSURE: 162 MMHG | OXYGEN SATURATION: 97 % | DIASTOLIC BLOOD PRESSURE: 76 MMHG | HEIGHT: 72 IN | HEART RATE: 74 BPM | WEIGHT: 240 LBS

## 2022-05-04 PROCEDURE — 99213 OFFICE O/P EST LOW 20 MIN: CPT

## 2022-05-04 NOTE — PHYSICAL EXAM
[General Appearance - Well Nourished] : well nourished [Abdomen Soft] : soft [Urinary Bladder Findings] : the bladder was normal on palpation [Skin Color & Pigmentation] : normal skin color and pigmentation [Heart Rate And Rhythm] : Heart rate and rhythm were normal [] : no respiratory distress [Oriented To Time, Place, And Person] : oriented to person, place, and time [Normal Station and Gait] : the gait and station were normal for the patient's age [No Focal Deficits] : no focal deficits

## 2022-05-04 NOTE — HISTORY OF PRESENT ILLNESS
[FreeTextEntry1] : 85 yo male patient, hx of radical prostatectomy, 25 years ago\par Patient and wife mention it was "even lower than Gl 7 disease"\par PSA was undetectable for years\par Recently in Jan/feb 2021: found to be 2.5\par Repeated PSA October 2021: PSA=3.96\par never gotten treatment for any biochemical recurrence\par PET Axumen scan Feb 2021: no evidence of avidity / recurrence\par Also he had new onset right flank pain: \par per report from outside phys in [place] jan feb 2021: severe hydro right side\par had stent insertion x 2, removed stent in april 2021 (hydro was assumed to be due to possible right ureter stricture)\par \par Now cr is rising to 1.8\par PSA rising to 3.96\par \par SHARITA: flat, no obvious evidence of PCA recurrence\par Recent Ultrasound:  cc from a prevoid of 158 cc\par no hydro on US\par \par Follow-up Nov 9:\par Bone scan: abnormal scan, single focus increased activity in skull - Do MRI\par CT non-contrast due to compromised renal function: No mets, no pelvic adenopathy.\par \par Follow-up Nov 29\par Saw Dr Mayfield: will proceed with ADT // MRI skull done: negative for mets\par \par FU May 2022: PSA <0.01 / still on ADT: Lupron injection 22.5 Q3 months - patients most bothersome sx from leupron were fatigue, decreased libido. unable to have sex. very bothered by leupron. Skin normal color for race, warm, dry and intact. No evidence of rash.

## 2022-05-04 NOTE — LETTER BODY
[Dear  ___] : Dear  [unfilled], [Consult Letter:] : I had the pleasure of evaluating your patient, [unfilled]. [Please see my note below.] : Please see my note below. [Consult Closing:] : Thank you very much for allowing me to participate in the care of this patient.  If you have any questions, please do not hesitate to contact me. [Sincerely,] : Sincerely, [FreeTextEntry1] : Biochemical recurrence of Prostate cancer PSA 3.96\par R/O metastatic hormone sensitive PCa [FreeTextEntry3] : Brandon Keller MD\par Urologic Oncology\par Robotic & Endoscopic urology\par [facility] [place] of [facility]rology at [place]\par [facility]\par

## 2022-05-04 NOTE — ASSESSMENT
[FreeTextEntry1] : Biochemically recurrent PCa, steadily rising PSA - now PSA 3.96 - R/O metastatic hormone sensitive prostate cancer\par Elevated Creat: to 1.8\par hx of right hydro s/p stent placement x2\par Nov 2021: CT non-contrast due to compromised renal function: No mets, no pelvic adenopathy, no hydro.\par Bone scan: abnormal scan, single focus increased activity in skull - DO MRI\par Follow-up Nov 29\par Saw Dr Mayfield: will proceed with ADT\par MRI skull done: still not read\par \par FU May 2022: PSA <0.01 / still on ADT: Lupron injection 22.5 Q3 months - patients most bothersome sx from leupron were fatigue, decreased libido. unable to have sex. very bothered by leupron.\par \par \par Plan:\par discuss intermittent ADT with Dr Downs given PSA <0.01 and severe sx from leupron that compromise his QOL goyo libido.\par \par All images were reviewed and explained thoroughly\par All the patient's questions were answered to the best of my ability.\par I appreciate the opportunity in taking care of Mr/Mrs       and will take excellent care of him/her.\par \par \par \par \par

## 2022-05-06 ENCOUNTER — APPOINTMENT (OUTPATIENT)
Dept: HEMATOLOGY ONCOLOGY | Facility: CLINIC | Age: 87
End: 2022-05-06

## 2022-05-06 ENCOUNTER — APPOINTMENT (OUTPATIENT)
Dept: INFUSION THERAPY | Facility: CANCER CENTER | Age: 87
End: 2022-05-06

## 2022-05-11 ENCOUNTER — OUTPATIENT (OUTPATIENT)
Dept: OUTPATIENT SERVICES | Facility: HOSPITAL | Age: 87
LOS: 1 days | End: 2022-05-11
Payer: MEDICARE

## 2022-05-11 DIAGNOSIS — C61 MALIGNANT NEOPLASM OF PROSTATE: ICD-10-CM

## 2022-05-18 ENCOUNTER — RESULT REVIEW (OUTPATIENT)
Age: 87
End: 2022-05-18

## 2022-05-18 ENCOUNTER — APPOINTMENT (OUTPATIENT)
Dept: HEMATOLOGY ONCOLOGY | Facility: CLINIC | Age: 87
End: 2022-05-18
Payer: MEDICARE

## 2022-05-18 ENCOUNTER — APPOINTMENT (OUTPATIENT)
Dept: INFUSION THERAPY | Facility: CANCER CENTER | Age: 87
End: 2022-05-18

## 2022-05-18 VITALS
BODY MASS INDEX: 34.48 KG/M2 | TEMPERATURE: 97.9 F | SYSTOLIC BLOOD PRESSURE: 139 MMHG | DIASTOLIC BLOOD PRESSURE: 72 MMHG | WEIGHT: 254.25 LBS | OXYGEN SATURATION: 95 % | HEART RATE: 83 BPM

## 2022-05-18 LAB
BASOPHILS # BLD AUTO: 0.01 K/UL — SIGNIFICANT CHANGE UP (ref 0–0.2)
BASOPHILS NFR BLD AUTO: 0.2 % — SIGNIFICANT CHANGE UP (ref 0–2)
EOSINOPHIL # BLD AUTO: 0.07 K/UL — SIGNIFICANT CHANGE UP (ref 0–0.5)
EOSINOPHIL NFR BLD AUTO: 1.2 % — SIGNIFICANT CHANGE UP (ref 0–6)
HCT VFR BLD CALC: 41 % — SIGNIFICANT CHANGE UP (ref 39–50)
HGB BLD-MCNC: 14.1 G/DL — SIGNIFICANT CHANGE UP (ref 13–17)
IMM GRANULOCYTES NFR BLD AUTO: 0.8 % — SIGNIFICANT CHANGE UP (ref 0–1.5)
LYMPHOCYTES # BLD AUTO: 2.65 K/UL — SIGNIFICANT CHANGE UP (ref 1–3.3)
LYMPHOCYTES # BLD AUTO: 44.5 % — HIGH (ref 13–44)
MCHC RBC-ENTMCNC: 32.3 PG — SIGNIFICANT CHANGE UP (ref 27–34)
MCHC RBC-ENTMCNC: 34.4 GM/DL — SIGNIFICANT CHANGE UP (ref 32–36)
MCV RBC AUTO: 93.8 FL — SIGNIFICANT CHANGE UP (ref 80–100)
MONOCYTES # BLD AUTO: 1.1 K/UL — HIGH (ref 0–0.9)
MONOCYTES NFR BLD AUTO: 18.5 % — HIGH (ref 2–14)
NEUTROPHILS # BLD AUTO: 2.07 K/UL — SIGNIFICANT CHANGE UP (ref 1.8–7.4)
NEUTROPHILS NFR BLD AUTO: 34.8 % — LOW (ref 43–77)
NRBC # BLD: 0 /100 WBCS — SIGNIFICANT CHANGE UP (ref 0–0)
PLATELET # BLD AUTO: 152 K/UL — SIGNIFICANT CHANGE UP (ref 150–400)
RBC # BLD: 4.37 M/UL — SIGNIFICANT CHANGE UP (ref 4.2–5.8)
RBC # FLD: 12.7 % — SIGNIFICANT CHANGE UP (ref 10.3–14.5)
WBC # BLD: 5.95 K/UL — SIGNIFICANT CHANGE UP (ref 3.8–10.5)
WBC # FLD AUTO: 5.95 K/UL — SIGNIFICANT CHANGE UP (ref 3.8–10.5)

## 2022-05-18 PROCEDURE — 99213 OFFICE O/P EST LOW 20 MIN: CPT

## 2022-05-18 PROCEDURE — 85027 COMPLETE CBC AUTOMATED: CPT

## 2022-05-19 LAB
ALBUMIN SERPL ELPH-MCNC: 4.6 G/DL
ALP BLD-CCNC: 91 U/L
ALT SERPL-CCNC: 53 U/L
ANION GAP SERPL CALC-SCNC: 15 MMOL/L
AST SERPL-CCNC: 48 U/L
BILIRUB SERPL-MCNC: 0.4 MG/DL
BUN SERPL-MCNC: 18 MG/DL
CALCIUM SERPL-MCNC: 9 MG/DL
CHLORIDE SERPL-SCNC: 102 MMOL/L
CO2 SERPL-SCNC: 21 MMOL/L
CREAT SERPL-MCNC: 1.78 MG/DL
EGFR: 37 ML/MIN/1.73M2
GLUCOSE SERPL-MCNC: 100 MG/DL
POTASSIUM SERPL-SCNC: 4.4 MMOL/L
PROT SERPL-MCNC: 7.3 G/DL
PSA SERPL-MCNC: <0.01 NG/ML
SODIUM SERPL-SCNC: 139 MMOL/L

## 2022-05-19 NOTE — PHYSICAL EXAM
[Ambulatory and capable of all self care but unable to carry out any work activities] : Status 2- Ambulatory and capable of all self care but unable to carry out any work activities. Up and about more than 50% of waking hours [Obese] : obese [Normal] : affect appropriate [de-identified] : walks with a cane [de-identified] : anicteric [de-identified] : antalgic gait

## 2022-05-19 NOTE — HISTORY OF PRESENT ILLNESS
[de-identified] : Mr. Couch has a remote history of prostate cancer status-post prostatectomy (patient states 20 years ago), here for evaluation of thrombocytopenia. Paulino spends part of the year near [place], [place]. 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 He states that his PSA was elevated at that time, but that a PET-CT was negative for recurrent disease. \par \par Upon his return to [place], he saw Dr. Natarajan, who incidentally found a platelet count of 104. PSA was 2.7 at that time. Upon my review of available labs, Mr. Couch has had intermittent bouts of thrombocytopenia going back to at least 2019, ranging from 102 to 138. Of note, ESR and CRP were elevated at that time.\par \par Paulino drinks 2 alcoholic drinks every other day, is not known to have any liver disease or hepatosplenomegaly. He denies a history of chronic viral illnesses, no ongoing blood loss. 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 He had a bone scan that showed a single focus of increased activity in the skull- awaiting MRI head. CT revealed no evidence of metastatic disease (axumen PET earlier this year was WNL).\par  [de-identified] : And received his first Lupron shot in December, 2021.  Noted decreased libido, fatigue, generalized weakness with its use.  He [place] and off to [place] and is now 2 months later.  His next shot.  He has seen Dr. Keller to discuss his side effects.  Returns to our office in follow-up. Feeling better now that off of ADT.

## 2022-05-19 NOTE — REVIEW OF SYSTEMS
[Joint Pain] : joint pain [Fever] : no fever [Chills] : no chills [Fatigue] : no fatigue [Vision Problems] : no vision problems [Dysphagia] : no dysphagia [Odynophagia] : no odynophagia [Chest Pain] : no chest pain [Lower Ext Edema] : no lower extremity edema [Shortness Of Breath] : no shortness of breath [SOB on Exertion] : no shortness of breath during exertion [Abdominal Pain] : no abdominal pain [Skin Rash] : no skin rash [Fainting] : no fainting [Difficulty Walking] : no difficulty walking [Anxiety] : no anxiety [Depression] : no depression [Easy Bleeding] : no tendency for easy bleeding [Swollen Glands] : no swollen glands [FreeTextEntry2] : weight gain [FreeTextEntry9] : back pain, chronic

## 2022-05-19 NOTE — PHYSICAL EXAM
[Ambulatory and capable of all self care but unable to carry out any work activities] : Status 2- Ambulatory and capable of all self care but unable to carry out any work activities. Up and about more than 50% of waking hours [Obese] : obese [Normal] : affect appropriate [de-identified] : walks with a cane [de-identified] : anicteric [de-identified] : antalgic gait

## 2022-05-19 NOTE — HISTORY OF PRESENT ILLNESS
[de-identified] : Mr. Couch has a remote history of prostate cancer status-post prostatectomy (patient states 20 years ago), here for evaluation of thrombocytopenia. Paulino spends part of the year near [place], [place]. 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 He states that his PSA was elevated at that time, but that a PET-CT was negative for recurrent disease. \par \par Upon his return to [place], he saw Dr. Natarajan, who incidentally found a platelet count of 104. PSA was 2.7 at that time. Upon my review of available labs, Mr. Couch has had intermittent bouts of thrombocytopenia going back to at least 2019, ranging from 102 to 138. Of note, ESR and CRP were elevated at that time.\par \par Paulino drinks 2 alcoholic drinks every other day, is not known to have any liver disease or hepatosplenomegaly. He denies a history of chronic viral illnesses, no ongoing blood loss. 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 He had a bone scan that showed a single focus of increased activity in the skull- awaiting MRI head. CT revealed no evidence of metastatic disease (axumen PET earlier this year was WNL).\par  [de-identified] : And received his first Lupron shot in December, 2021.  Noted decreased libido, fatigue, generalized weakness with its use.  He Opal and off to [place] and is now 2 months later.  His next shot.  He has seen Dr. Keller to discuss his side effects.  Returns to our office in follow-up. Feeling better now that off of ADT.

## 2022-08-02 ENCOUNTER — LABORATORY RESULT (OUTPATIENT)
Age: 87
End: 2022-08-02

## 2022-08-03 ENCOUNTER — APPOINTMENT (OUTPATIENT)
Dept: FAMILY MEDICINE | Facility: CLINIC | Age: 87
End: 2022-08-03

## 2022-08-03 PROCEDURE — 36415 COLL VENOUS BLD VENIPUNCTURE: CPT

## 2022-08-18 ENCOUNTER — OUTPATIENT (OUTPATIENT)
Dept: OUTPATIENT SERVICES | Facility: HOSPITAL | Age: 87
LOS: 1 days | End: 2022-08-18
Payer: MEDICARE

## 2022-08-18 DIAGNOSIS — D64.9 ANEMIA, UNSPECIFIED: ICD-10-CM

## 2022-08-18 DIAGNOSIS — C61 MALIGNANT NEOPLASM OF PROSTATE: ICD-10-CM

## 2022-08-19 ENCOUNTER — RESULT REVIEW (OUTPATIENT)
Age: 87
End: 2022-08-19

## 2022-08-19 ENCOUNTER — APPOINTMENT (OUTPATIENT)
Dept: HEMATOLOGY ONCOLOGY | Facility: CLINIC | Age: 87
End: 2022-08-19

## 2022-08-19 VITALS
DIASTOLIC BLOOD PRESSURE: 73 MMHG | OXYGEN SATURATION: 95 % | SYSTOLIC BLOOD PRESSURE: 115 MMHG | HEART RATE: 81 BPM | WEIGHT: 252 LBS | BODY MASS INDEX: 34.13 KG/M2 | TEMPERATURE: 97.3 F | HEIGHT: 72 IN

## 2022-08-19 LAB
BASOPHILS # BLD AUTO: 0.01 K/UL — SIGNIFICANT CHANGE UP (ref 0–0.2)
BASOPHILS NFR BLD AUTO: 0.2 % — SIGNIFICANT CHANGE UP (ref 0–2)
EOSINOPHIL # BLD AUTO: 0.06 K/UL — SIGNIFICANT CHANGE UP (ref 0–0.5)
EOSINOPHIL NFR BLD AUTO: 1.1 % — SIGNIFICANT CHANGE UP (ref 0–6)
HCT VFR BLD CALC: 41.9 % — SIGNIFICANT CHANGE UP (ref 39–50)
HGB BLD-MCNC: 14.4 G/DL — SIGNIFICANT CHANGE UP (ref 13–17)
IMM GRANULOCYTES NFR BLD AUTO: 0.4 % — SIGNIFICANT CHANGE UP (ref 0–1.5)
LYMPHOCYTES # BLD AUTO: 3.16 K/UL — SIGNIFICANT CHANGE UP (ref 1–3.3)
LYMPHOCYTES # BLD AUTO: 57.7 % — HIGH (ref 13–44)
MCHC RBC-ENTMCNC: 31.6 PG — SIGNIFICANT CHANGE UP (ref 27–34)
MCHC RBC-ENTMCNC: 34.4 GM/DL — SIGNIFICANT CHANGE UP (ref 32–36)
MCV RBC AUTO: 92.1 FL — SIGNIFICANT CHANGE UP (ref 80–100)
MONOCYTES # BLD AUTO: 0.86 K/UL — SIGNIFICANT CHANGE UP (ref 0–0.9)
MONOCYTES NFR BLD AUTO: 15.7 % — HIGH (ref 2–14)
NEUTROPHILS # BLD AUTO: 1.37 K/UL — LOW (ref 1.8–7.4)
NEUTROPHILS NFR BLD AUTO: 24.9 % — LOW (ref 43–77)
NRBC # BLD: 0 /100 WBCS — SIGNIFICANT CHANGE UP (ref 0–0)
PLATELET # BLD AUTO: 123 K/UL — LOW (ref 150–400)
RBC # BLD: 4.55 M/UL — SIGNIFICANT CHANGE UP (ref 4.2–5.8)
RBC # FLD: 12.5 % — SIGNIFICANT CHANGE UP (ref 10.3–14.5)
WBC # BLD: 5.48 K/UL — SIGNIFICANT CHANGE UP (ref 3.8–10.5)
WBC # FLD AUTO: 5.48 K/UL — SIGNIFICANT CHANGE UP (ref 3.8–10.5)

## 2022-08-19 PROCEDURE — 85027 COMPLETE CBC AUTOMATED: CPT

## 2022-08-19 PROCEDURE — 99213 OFFICE O/P EST LOW 20 MIN: CPT

## 2022-08-19 NOTE — PHYSICAL EXAM
[Ambulatory and capable of all self care but unable to carry out any work activities] : Status 2- Ambulatory and capable of all self care but unable to carry out any work activities. Up and about more than 50% of waking hours [Obese] : obese [Normal] : affect appropriate [de-identified] : walks with a cane [de-identified] : anicteric [de-identified] : antalgic gait

## 2022-08-19 NOTE — PHYSICAL EXAM
[Ambulatory and capable of all self care but unable to carry out any work activities] : Status 2- Ambulatory and capable of all self care but unable to carry out any work activities. Up and about more than 50% of waking hours [Obese] : obese [Normal] : affect appropriate [de-identified] : walks with a cane [de-identified] : anicteric [de-identified] : antalgic gait

## 2022-08-19 NOTE — HISTORY OF PRESENT ILLNESS
[de-identified] : Mr. Couch has a remote history of prostate cancer status-post prostatectomy (patient states 20 years ago), here for evaluation of thrombocytopenia. Paulino spends part of the year near [place], [place]. 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 He states that his PSA was elevated at that time, but that a PET-CT was negative for recurrent disease. \par \par Upon his return to [place], he saw Dr. Natarajan, who incidentally found a platelet count of 104. PSA was 2.7 at that time. Upon my review of available labs, Mr. Couch has had intermittent bouts of thrombocytopenia going back to at least 2019, ranging from 102 to 138. Of note, ESR and CRP were elevated at that time.\par \par Paulino drinks 2 alcoholic drinks every other day, is not known to have any liver disease or hepatosplenomegaly. He denies a history of chronic viral illnesses, no ongoing blood loss. 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 He had a bone scan that showed a single focus of increased activity in the skull- awaiting MRI head. CT revealed no evidence of metastatic disease (axumen PET earlier this year was WNL).\par  [de-identified] : And received his first Lupron shot in December, 2021.  Noted decreased libido, fatigue, generalized weakness with its use.  He Opal and off to [place] and is now 2 months later.  His next shot.  He has seen Dr. Keller to discuss his side effects.  Returns to our office in follow-up. Feeling better now that off of ADT.

## 2022-08-19 NOTE — REVIEW OF SYSTEMS
[Fever] : no fever [Chills] : no chills [Fatigue] : no fatigue [Vision Problems] : no vision problems [Dysphagia] : no dysphagia [Odynophagia] : no odynophagia [Chest Pain] : no chest pain [Lower Ext Edema] : no lower extremity edema [Shortness Of Breath] : no shortness of breath [SOB on Exertion] : no shortness of breath during exertion [Abdominal Pain] : no abdominal pain [Joint Pain] : joint pain [Skin Rash] : no skin rash [Fainting] : no fainting [Difficulty Walking] : no difficulty walking [Anxiety] : no anxiety [Depression] : no depression [Easy Bleeding] : no tendency for easy bleeding [Swollen Glands] : no swollen glands [FreeTextEntry2] : weight gain [FreeTextEntry9] : back pain, chronic

## 2022-08-22 LAB
ALBUMIN SERPL ELPH-MCNC: 4.4 G/DL
ALP BLD-CCNC: 77 U/L
ALT SERPL-CCNC: 53 U/L
ANION GAP SERPL CALC-SCNC: 13 MMOL/L
AST SERPL-CCNC: 53 U/L
BILIRUB SERPL-MCNC: 0.7 MG/DL
BUN SERPL-MCNC: 21 MG/DL
CALCIUM SERPL-MCNC: 9.2 MG/DL
CHLORIDE SERPL-SCNC: 103 MMOL/L
CO2 SERPL-SCNC: 19 MMOL/L
CREAT SERPL-MCNC: 2.05 MG/DL
EGFR: 31 ML/MIN/1.73M2
GLUCOSE SERPL-MCNC: 152 MG/DL
POTASSIUM SERPL-SCNC: 4.4 MMOL/L
PROT SERPL-MCNC: 7.1 G/DL
PSA SERPL-MCNC: 0.02 NG/ML
SODIUM SERPL-SCNC: 136 MMOL/L

## 2022-09-16 ENCOUNTER — NON-APPOINTMENT (OUTPATIENT)
Age: 87
End: 2022-09-16

## 2022-09-16 ENCOUNTER — APPOINTMENT (OUTPATIENT)
Dept: OPHTHALMOLOGY | Facility: CLINIC | Age: 87
End: 2022-09-16

## 2022-09-16 PROCEDURE — 99213 OFFICE O/P EST LOW 20 MIN: CPT

## 2022-09-30 ENCOUNTER — NON-APPOINTMENT (OUTPATIENT)
Age: 87
End: 2022-09-30

## 2022-09-30 ENCOUNTER — APPOINTMENT (OUTPATIENT)
Dept: OPHTHALMOLOGY | Facility: CLINIC | Age: 87
End: 2022-09-30

## 2022-09-30 PROCEDURE — 92014 COMPRE OPH EXAM EST PT 1/>: CPT

## 2022-12-11 ENCOUNTER — OUTPATIENT (OUTPATIENT)
Dept: OUTPATIENT SERVICES | Facility: HOSPITAL | Age: 87
LOS: 1 days | End: 2022-12-11

## 2022-12-11 DIAGNOSIS — C61 MALIGNANT NEOPLASM OF PROSTATE: ICD-10-CM

## 2022-12-14 ENCOUNTER — APPOINTMENT (OUTPATIENT)
Age: 87
End: 2022-12-14
Payer: MEDICARE

## 2022-12-14 VITALS
WEIGHT: 248.8 LBS | HEART RATE: 75 BPM | HEIGHT: 72 IN | DIASTOLIC BLOOD PRESSURE: 84 MMHG | OXYGEN SATURATION: 95 % | SYSTOLIC BLOOD PRESSURE: 146 MMHG | BODY MASS INDEX: 33.7 KG/M2 | TEMPERATURE: 97.4 F

## 2022-12-14 DIAGNOSIS — C61 MALIGNANT NEOPLASM OF PROSTATE: ICD-10-CM

## 2022-12-14 DIAGNOSIS — D69.6 THROMBOCYTOPENIA, UNSPECIFIED: ICD-10-CM

## 2022-12-14 PROCEDURE — 99213 OFFICE O/P EST LOW 20 MIN: CPT

## 2023-01-02 PROBLEM — D69.6 THROMBOCYTOPENIA: Status: ACTIVE | Noted: 2021-05-18

## 2023-01-02 PROBLEM — C61 MALIGNANT NEOPLASM OF PROSTATE: Status: ACTIVE | Noted: 2020-10-02

## 2023-01-02 NOTE — PHYSICAL EXAM
Call to [facility] for evaluation. [Ambulatory and capable of all self care but unable to carry out any work activities] : Status 2- Ambulatory and capable of all self care but unable to carry out any work activities. Up and about more than 50% of waking hours [Obese] : obese [Normal] : affect appropriate [de-identified] : walks with a cane [de-identified] : anicteric [de-identified] : antalgic gait

## 2023-01-02 NOTE — RESULTS/DATA
[FreeTextEntry1] : Mr. Couch is a pleasant 87 year-old man with a history of prostate cancer status-post prostatectomy, incidentally found to have mild thrombocytopenia.

## 2023-01-02 NOTE — REVIEW OF SYSTEMS
[Joint Pain] : joint pain [Fever] : no fever [Chills] : no chills [Fatigue] : no fatigue [Recent Change In Weight] : ~T recent weight change [Vision Problems] : no vision problems [Dysphagia] : no dysphagia [Odynophagia] : no odynophagia [Chest Pain] : no chest pain [Lower Ext Edema] : no lower extremity edema [Shortness Of Breath] : no shortness of breath [SOB on Exertion] : no shortness of breath during exertion [Abdominal Pain] : no abdominal pain [Skin Rash] : no skin rash [Fainting] : no fainting [Difficulty Walking] : no difficulty walking [Anxiety] : no anxiety [Depression] : no depression [Easy Bleeding] : no tendency for easy bleeding [Swollen Glands] : no swollen glands [FreeTextEntry9] : back pain, chronic

## 2023-01-02 NOTE — HISTORY OF PRESENT ILLNESS
[de-identified] : Mr. Couch has a remote history of prostate cancer status-post prostatectomy (patient states 20 years ago), here for evaluation of thrombocytopenia. Paulino spends part of the year near [place], [place]. 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 He states that his PSA was elevated at that time, but that a PET-CT was negative for recurrent disease. \par \par Upon his return to [place], he saw Dr. Natarajan, who incidentally found a platelet count of 104. PSA was 2.7 at that time. Upon my review of available labs, Mr. Couch has had intermittent bouts of thrombocytopenia going back to at least 2019, ranging from 102 to 138. Of note, ESR and CRP were elevated at that time.\par \par Paulino drinks 2 alcoholic drinks every other day, is not known to have any liver disease or hepatosplenomegaly. He denies a history of chronic viral illnesses, no ongoing blood loss. He notes arm ecchymoses, but no petechiae.\par \par Follow-up with his urologists Arlene Keller and oCdi revealed continued rise in PSA concerning for biochemical recurrence (PSA rising from 2.5 to 3.96 over the past year). He had a bone scan that showed a single focus of increased activity in the skull- awaiting MRI head. CT revealed no evidence of metastatic disease (axumen PET earlier this year was WNL).\par  [de-identified] : Most recent PSA 0.2. Creatinine unchanged above 2. He denies any ongoing fevers or chills, no headache, no lumps or bumps, no weight loss, no bleeding or bruising.\par

## 2023-05-19 ENCOUNTER — NON-APPOINTMENT (OUTPATIENT)
Age: 88
End: 2023-05-19

## 2023-05-19 ENCOUNTER — APPOINTMENT (OUTPATIENT)
Dept: OPHTHALMOLOGY | Facility: CLINIC | Age: 88
End: 2023-05-19
Payer: MEDICARE

## 2023-05-19 PROCEDURE — 92133 CPTRZD OPH DX IMG PST SGM ON: CPT

## 2023-05-19 PROCEDURE — 99214 OFFICE O/P EST MOD 30 MIN: CPT

## 2023-07-11 ENCOUNTER — APPOINTMENT (OUTPATIENT)
Dept: UROLOGY | Facility: CLINIC | Age: 88
End: 2023-07-11
Payer: MEDICARE

## 2023-07-11 VITALS
HEIGHT: 72 IN | SYSTOLIC BLOOD PRESSURE: 123 MMHG | HEART RATE: 69 BPM | WEIGHT: 236 LBS | DIASTOLIC BLOOD PRESSURE: 79 MMHG | TEMPERATURE: 97.2 F | BODY MASS INDEX: 31.97 KG/M2

## 2023-07-11 PROCEDURE — 99213 OFFICE O/P EST LOW 20 MIN: CPT

## 2023-07-11 PROCEDURE — 51798 US URINE CAPACITY MEASURE: CPT

## 2023-07-11 NOTE — HISTORY OF PRESENT ILLNESS
[FreeTextEntry1] : Pt comes in having hesitancy and having difficulty urinating. Pt follows with [facility] for his Prostate cancer. \par Pt feels he has to push to urinate.\par \par PVR 0\par

## 2023-07-12 LAB
APPEARANCE: CLEAR
BACTERIA: NEGATIVE /HPF
BILIRUBIN URINE: NEGATIVE
BLOOD URINE: NEGATIVE
CAST: 2 /LPF
COLOR: YELLOW
EPITHELIAL CELLS: 0 /HPF
GLUCOSE QUALITATIVE U: NEGATIVE MG/DL
KETONES URINE: NEGATIVE MG/DL
LEUKOCYTE ESTERASE URINE: NEGATIVE
MICROSCOPIC-UA: NORMAL
NITRITE URINE: NEGATIVE
PH URINE: 5.5
PROTEIN URINE: 30 MG/DL
RED BLOOD CELLS URINE: 1 /HPF
SPECIFIC GRAVITY URINE: 1.02
UROBILINOGEN URINE: 0.2 MG/DL
WHITE BLOOD CELLS URINE: 0 /HPF

## 2023-07-13 LAB — BACTERIA UR CULT: NORMAL

## 2023-08-01 ENCOUNTER — APPOINTMENT (OUTPATIENT)
Dept: UROLOGY | Facility: CLINIC | Age: 88
End: 2023-08-01

## 2023-08-28 RX ORDER — ALFUZOSIN HYDROCHLORIDE 10 MG/1
10 TABLET, EXTENDED RELEASE ORAL
Qty: 90 | Refills: 3 | Status: ACTIVE | COMMUNITY
Start: 2020-10-02 | End: 1900-01-01

## 2023-11-02 ENCOUNTER — APPOINTMENT (OUTPATIENT)
Dept: UROLOGY | Facility: CLINIC | Age: 88
End: 2023-11-02
Payer: MEDICARE

## 2023-11-02 VITALS
BODY MASS INDEX: 27.31 KG/M2 | HEART RATE: 84 BPM | WEIGHT: 236 LBS | SYSTOLIC BLOOD PRESSURE: 147 MMHG | DIASTOLIC BLOOD PRESSURE: 91 MMHG | TEMPERATURE: 97.8 F | HEIGHT: 78 IN

## 2023-11-02 DIAGNOSIS — R39.9 UNSPECIFIED SYMPTOMS AND SIGNS INVOLVING THE GENITOURINARY SYSTEM: ICD-10-CM

## 2023-11-02 PROCEDURE — 99214 OFFICE O/P EST MOD 30 MIN: CPT

## 2023-11-06 LAB — PSA SERPL-MCNC: 1.89 NG/ML

## 2023-11-16 ENCOUNTER — APPOINTMENT (OUTPATIENT)
Dept: UROLOGY | Facility: CLINIC | Age: 88
End: 2023-11-16
Payer: MEDICARE

## 2023-11-16 VITALS
DIASTOLIC BLOOD PRESSURE: 79 MMHG | HEART RATE: 101 BPM | SYSTOLIC BLOOD PRESSURE: 154 MMHG | BODY MASS INDEX: 27.31 KG/M2 | HEIGHT: 78 IN | WEIGHT: 236 LBS | TEMPERATURE: 97.3 F

## 2023-11-16 DIAGNOSIS — R97.21 MALIGNANT NEOPLASM OF PROSTATE: ICD-10-CM

## 2023-11-16 DIAGNOSIS — C61 MALIGNANT NEOPLASM OF PROSTATE: ICD-10-CM

## 2023-11-16 PROCEDURE — 99213 OFFICE O/P EST LOW 20 MIN: CPT

## 2023-11-17 ENCOUNTER — APPOINTMENT (OUTPATIENT)
Dept: OPHTHALMOLOGY | Facility: CLINIC | Age: 88
End: 2023-11-17
Payer: MEDICARE

## 2023-11-17 ENCOUNTER — NON-APPOINTMENT (OUTPATIENT)
Age: 88
End: 2023-11-17

## 2023-11-17 PROCEDURE — 92134 CPTRZ OPH DX IMG PST SGM RTA: CPT

## 2023-11-17 PROCEDURE — 92014 COMPRE OPH EXAM EST PT 1/>: CPT

## 2024-01-24 RX ORDER — SILODOSIN 8 MG/1
8 CAPSULE ORAL DAILY
Qty: 90 | Refills: 1 | Status: ACTIVE | COMMUNITY
Start: 2023-07-11 | End: 1900-01-01

## 2024-05-16 ENCOUNTER — TRANSCRIPTION ENCOUNTER (OUTPATIENT)
Age: 89
End: 2024-05-16

## 2024-07-10 ENCOUNTER — APPOINTMENT (OUTPATIENT)
Dept: RADIOLOGY | Facility: CLINIC | Age: 89
End: 2024-07-10
Payer: MEDICARE

## 2024-07-10 PROCEDURE — 71101 X-RAY EXAM UNILAT RIBS/CHEST: CPT | Mod: RT

## 2024-08-06 ENCOUNTER — RESULT REVIEW (OUTPATIENT)
Age: 89
End: 2024-08-06

## 2024-10-11 ENCOUNTER — APPOINTMENT (OUTPATIENT)
Dept: OPHTHALMOLOGY | Facility: CLINIC | Age: 89
End: 2024-10-11

## 2024-11-25 ENCOUNTER — RESULT REVIEW (OUTPATIENT)
Age: 89
End: 2024-11-25